# Patient Record
Sex: FEMALE | Race: WHITE | URBAN - METROPOLITAN AREA
[De-identification: names, ages, dates, MRNs, and addresses within clinical notes are randomized per-mention and may not be internally consistent; named-entity substitution may affect disease eponyms.]

---

## 2018-10-21 ENCOUNTER — EMERGENCY (EMERGENCY)
Facility: HOSPITAL | Age: 63
LOS: 1 days | End: 2018-10-21
Payer: COMMERCIAL

## 2018-10-21 PROCEDURE — 73562 X-RAY EXAM OF KNEE 3: CPT | Mod: 26,LT

## 2018-10-21 PROCEDURE — 73610 X-RAY EXAM OF ANKLE: CPT | Mod: 26,LT

## 2018-10-21 PROCEDURE — 73590 X-RAY EXAM OF LOWER LEG: CPT | Mod: 26,LT

## 2018-10-21 PROCEDURE — 99283 EMERGENCY DEPT VISIT LOW MDM: CPT

## 2022-09-23 ENCOUNTER — INPATIENT (INPATIENT)
Facility: HOSPITAL | Age: 67
LOS: 1 days | Discharge: HOME | End: 2022-09-25
Attending: INTERNAL MEDICINE | Admitting: INTERNAL MEDICINE
Payer: COMMERCIAL

## 2022-09-23 VITALS
SYSTOLIC BLOOD PRESSURE: 145 MMHG | RESPIRATION RATE: 18 BRPM | TEMPERATURE: 99 F | DIASTOLIC BLOOD PRESSURE: 85 MMHG | OXYGEN SATURATION: 98 % | HEART RATE: 65 BPM

## 2022-09-23 DIAGNOSIS — R09.81 NASAL CONGESTION: ICD-10-CM

## 2022-09-23 DIAGNOSIS — E87.1 HYPO-OSMOLALITY AND HYPONATREMIA: ICD-10-CM

## 2022-09-23 DIAGNOSIS — I10 ESSENTIAL (PRIMARY) HYPERTENSION: ICD-10-CM

## 2022-09-23 DIAGNOSIS — Z78.9 OTHER SPECIFIED HEALTH STATUS: Chronic | ICD-10-CM

## 2022-09-23 DIAGNOSIS — M85.80 OTHER SPECIFIED DISORDERS OF BONE DENSITY AND STRUCTURE, UNSPECIFIED SITE: ICD-10-CM

## 2022-09-23 DIAGNOSIS — E03.9 HYPOTHYROIDISM, UNSPECIFIED: ICD-10-CM

## 2022-09-23 DIAGNOSIS — U07.1 COVID-19: ICD-10-CM

## 2022-09-23 DIAGNOSIS — E78.5 HYPERLIPIDEMIA, UNSPECIFIED: ICD-10-CM

## 2022-09-23 DIAGNOSIS — Z29.9 ENCOUNTER FOR PROPHYLACTIC MEASURES, UNSPECIFIED: ICD-10-CM

## 2022-09-23 LAB
ALBUMIN SERPL ELPH-MCNC: 3.9 G/DL — SIGNIFICANT CHANGE UP (ref 3.3–5)
ALP SERPL-CCNC: 49 U/L — SIGNIFICANT CHANGE UP (ref 40–120)
ALT FLD-CCNC: 14 U/L — SIGNIFICANT CHANGE UP (ref 4–33)
ANION GAP SERPL CALC-SCNC: 10 MMOL/L — SIGNIFICANT CHANGE UP (ref 7–14)
ANION GAP SERPL CALC-SCNC: 9 MMOL/L — SIGNIFICANT CHANGE UP (ref 7–14)
AST SERPL-CCNC: 29 U/L — SIGNIFICANT CHANGE UP (ref 4–32)
BASOPHILS # BLD AUTO: 0.02 K/UL — SIGNIFICANT CHANGE UP (ref 0–0.2)
BASOPHILS NFR BLD AUTO: 0.5 % — SIGNIFICANT CHANGE UP (ref 0–2)
BILIRUB SERPL-MCNC: 0.6 MG/DL — SIGNIFICANT CHANGE UP (ref 0.2–1.2)
BUN SERPL-MCNC: 10 MG/DL — SIGNIFICANT CHANGE UP (ref 7–23)
BUN SERPL-MCNC: 9 MG/DL — SIGNIFICANT CHANGE UP (ref 7–23)
CALCIUM SERPL-MCNC: 7.9 MG/DL — LOW (ref 8.4–10.5)
CALCIUM SERPL-MCNC: 8.2 MG/DL — LOW (ref 8.4–10.5)
CHLORIDE SERPL-SCNC: 93 MMOL/L — LOW (ref 98–107)
CHLORIDE SERPL-SCNC: 94 MMOL/L — LOW (ref 98–107)
CO2 SERPL-SCNC: 19 MMOL/L — LOW (ref 22–31)
CO2 SERPL-SCNC: 22 MMOL/L — SIGNIFICANT CHANGE UP (ref 22–31)
CREAT SERPL-MCNC: 0.6 MG/DL — SIGNIFICANT CHANGE UP (ref 0.5–1.3)
CREAT SERPL-MCNC: 0.77 MG/DL — SIGNIFICANT CHANGE UP (ref 0.5–1.3)
EGFR: 85 ML/MIN/1.73M2 — SIGNIFICANT CHANGE UP
EGFR: 99 ML/MIN/1.73M2 — SIGNIFICANT CHANGE UP
EOSINOPHIL # BLD AUTO: 0.07 K/UL — SIGNIFICANT CHANGE UP (ref 0–0.5)
EOSINOPHIL NFR BLD AUTO: 1.7 % — SIGNIFICANT CHANGE UP (ref 0–6)
FLUAV AG NPH QL: SIGNIFICANT CHANGE UP
FLUBV AG NPH QL: SIGNIFICANT CHANGE UP
GLUCOSE SERPL-MCNC: 83 MG/DL — SIGNIFICANT CHANGE UP (ref 70–99)
GLUCOSE SERPL-MCNC: 93 MG/DL — SIGNIFICANT CHANGE UP (ref 70–99)
HCT VFR BLD CALC: 38.3 % — SIGNIFICANT CHANGE UP (ref 34.5–45)
HGB BLD-MCNC: 13.6 G/DL — SIGNIFICANT CHANGE UP (ref 11.5–15.5)
IANC: 1.04 K/UL — LOW (ref 1.8–7.4)
IMM GRANULOCYTES NFR BLD AUTO: 0.2 % — SIGNIFICANT CHANGE UP (ref 0–0.9)
LIDOCAIN IGE QN: 37 U/L — SIGNIFICANT CHANGE UP (ref 7–60)
LYMPHOCYTES # BLD AUTO: 2.58 K/UL — SIGNIFICANT CHANGE UP (ref 1–3.3)
LYMPHOCYTES # BLD AUTO: 63.5 % — HIGH (ref 13–44)
MAGNESIUM SERPL-MCNC: 1.7 MG/DL — SIGNIFICANT CHANGE UP (ref 1.6–2.6)
MCHC RBC-ENTMCNC: 31.6 PG — SIGNIFICANT CHANGE UP (ref 27–34)
MCHC RBC-ENTMCNC: 35.5 GM/DL — SIGNIFICANT CHANGE UP (ref 32–36)
MCV RBC AUTO: 89.1 FL — SIGNIFICANT CHANGE UP (ref 80–100)
MONOCYTES # BLD AUTO: 0.34 K/UL — SIGNIFICANT CHANGE UP (ref 0–0.9)
MONOCYTES NFR BLD AUTO: 8.4 % — SIGNIFICANT CHANGE UP (ref 2–14)
NEUTROPHILS # BLD AUTO: 1.04 K/UL — LOW (ref 1.8–7.4)
NEUTROPHILS NFR BLD AUTO: 25.7 % — LOW (ref 43–77)
NRBC # BLD: 0 /100 WBCS — SIGNIFICANT CHANGE UP (ref 0–0)
NRBC # FLD: 0 K/UL — SIGNIFICANT CHANGE UP (ref 0–0)
OSMOLALITY SERPL: 260 MOSM/KG — LOW (ref 275–295)
OSMOLALITY UR: 355 MOSM/KG — SIGNIFICANT CHANGE UP (ref 50–1200)
PHOSPHATE SERPL-MCNC: 2.2 MG/DL — LOW (ref 2.5–4.5)
PLATELET # BLD AUTO: 200 K/UL — SIGNIFICANT CHANGE UP (ref 150–400)
POTASSIUM SERPL-MCNC: 4 MMOL/L — SIGNIFICANT CHANGE UP (ref 3.5–5.3)
POTASSIUM SERPL-MCNC: 4.1 MMOL/L — SIGNIFICANT CHANGE UP (ref 3.5–5.3)
POTASSIUM SERPL-SCNC: 4 MMOL/L — SIGNIFICANT CHANGE UP (ref 3.5–5.3)
POTASSIUM SERPL-SCNC: 4.1 MMOL/L — SIGNIFICANT CHANGE UP (ref 3.5–5.3)
PROT SERPL-MCNC: 7.1 G/DL — SIGNIFICANT CHANGE UP (ref 6–8.3)
RBC # BLD: 4.3 M/UL — SIGNIFICANT CHANGE UP (ref 3.8–5.2)
RBC # FLD: 12.7 % — SIGNIFICANT CHANGE UP (ref 10.3–14.5)
RSV RNA NPH QL NAA+NON-PROBE: SIGNIFICANT CHANGE UP
SARS-COV-2 RNA SPEC QL NAA+PROBE: DETECTED
SODIUM SERPL-SCNC: 123 MMOL/L — LOW (ref 135–145)
SODIUM SERPL-SCNC: 124 MMOL/L — LOW (ref 135–145)
SODIUM UR-SCNC: 95 MMOL/L — SIGNIFICANT CHANGE UP
T4 FREE SERPL-MCNC: 0.8 NG/DL — LOW (ref 0.9–1.8)
TSH SERPL-MCNC: 0.67 UIU/ML — SIGNIFICANT CHANGE UP (ref 0.27–4.2)
WBC # BLD: 4.06 K/UL — SIGNIFICANT CHANGE UP (ref 3.8–10.5)
WBC # FLD AUTO: 4.06 K/UL — SIGNIFICANT CHANGE UP (ref 3.8–10.5)

## 2022-09-23 PROCEDURE — 99223 1ST HOSP IP/OBS HIGH 75: CPT

## 2022-09-23 PROCEDURE — 99284 EMERGENCY DEPT VISIT MOD MDM: CPT

## 2022-09-23 RX ORDER — LANOLIN ALCOHOL/MO/W.PET/CERES
3 CREAM (GRAM) TOPICAL AT BEDTIME
Refills: 0 | Status: DISCONTINUED | OUTPATIENT
Start: 2022-09-23 | End: 2022-09-25

## 2022-09-23 RX ORDER — ONDANSETRON 8 MG/1
4 TABLET, FILM COATED ORAL ONCE
Refills: 0 | Status: COMPLETED | OUTPATIENT
Start: 2022-09-23 | End: 2022-09-23

## 2022-09-23 RX ORDER — SODIUM CHLORIDE 9 MG/ML
1000 INJECTION INTRAMUSCULAR; INTRAVENOUS; SUBCUTANEOUS ONCE
Refills: 0 | Status: COMPLETED | OUTPATIENT
Start: 2022-09-23 | End: 2022-09-23

## 2022-09-23 RX ORDER — PANTOPRAZOLE SODIUM 20 MG/1
40 TABLET, DELAYED RELEASE ORAL
Refills: 0 | Status: DISCONTINUED | OUTPATIENT
Start: 2022-09-23 | End: 2022-09-25

## 2022-09-23 RX ORDER — CHOLECALCIFEROL (VITAMIN D3) 125 MCG
1000 CAPSULE ORAL DAILY
Refills: 0 | Status: DISCONTINUED | OUTPATIENT
Start: 2022-09-23 | End: 2022-09-25

## 2022-09-23 RX ORDER — ACETAMINOPHEN 500 MG
650 TABLET ORAL EVERY 6 HOURS
Refills: 0 | Status: DISCONTINUED | OUTPATIENT
Start: 2022-09-23 | End: 2022-09-25

## 2022-09-23 RX ORDER — ATORVASTATIN CALCIUM 80 MG/1
20 TABLET, FILM COATED ORAL AT BEDTIME
Refills: 0 | Status: DISCONTINUED | OUTPATIENT
Start: 2022-09-23 | End: 2022-09-25

## 2022-09-23 RX ORDER — ENOXAPARIN SODIUM 100 MG/ML
40 INJECTION SUBCUTANEOUS EVERY 24 HOURS
Refills: 0 | Status: DISCONTINUED | OUTPATIENT
Start: 2022-09-24 | End: 2022-09-25

## 2022-09-23 RX ORDER — LEVOTHYROXINE SODIUM 125 MCG
50 TABLET ORAL DAILY
Refills: 0 | Status: DISCONTINUED | OUTPATIENT
Start: 2022-09-23 | End: 2022-09-25

## 2022-09-23 RX ADMIN — Medication 650 MILLIGRAM(S): at 23:15

## 2022-09-23 RX ADMIN — SODIUM CHLORIDE 1000 MILLILITER(S): 9 INJECTION INTRAMUSCULAR; INTRAVENOUS; SUBCUTANEOUS at 12:03

## 2022-09-23 RX ADMIN — ONDANSETRON 4 MILLIGRAM(S): 8 TABLET, FILM COATED ORAL at 12:02

## 2022-09-23 RX ADMIN — ONDANSETRON 4 MILLIGRAM(S): 8 TABLET, FILM COATED ORAL at 23:08

## 2022-09-23 RX ADMIN — Medication 650 MILLIGRAM(S): at 22:30

## 2022-09-23 RX ADMIN — ATORVASTATIN CALCIUM 20 MILLIGRAM(S): 80 TABLET, FILM COATED ORAL at 22:30

## 2022-09-23 NOTE — H&P ADULT - NSHPLABSRESULTS_GEN_ALL_CORE
13.6   4.06  )-----------( 200      ( 23 Sep 2022 12:09 )             38.3     09-23    123<L>  |  94<L>  |  9   ----------------------------<  93  4.1   |  19<L>  |  0.60    Ca    7.9<L>      23 Sep 2022 14:55  Phos  2.2     09-23  Mg     1.70     09-23    TPro  7.1  /  Alb  3.9  /  TBili  0.6  /  DBili  x   /  AST  29  /  ALT  14  /  AlkPhos  49  09-23        LIVER FUNCTIONS - ( 23 Sep 2022 12:09 )  Alb: 3.9 g/dL / Pro: 7.1 g/dL / ALK PHOS: 49 U/L / ALT: 14 U/L / AST: 29 U/L / GGT: x               EKG:     RADIOLOGY STUDIES:

## 2022-09-23 NOTE — H&P ADULT - HISTORY OF PRESENT ILLNESS
Mrs. Galvan is a 65 yo woman with htn, hld, hypothyroid, osteopenia who presents after testing COVID positive with congestion and hyponatremia on labs. She had been feeling mild dizziness and fatigue which led her to take a COVID test on Monday 9/19. Since then she has been feeling face/head "pressure," chills, cough, and yesterday she felt nauseated and vomited white fluid. Home temperature measured up to 100F. She feels improved but presented because she thought her symptoms were persisting too long.     She has tried tylenol which helped pain a little and went to urgent care yesterday where she was given albuterol inhaler, has used twice but hard to notice a difference. She has been eating less than normal; last night she had soup and gatorade. She denies night sweats/weight loss, CP, SOB, abd pain, dysuria, urinary frequency, diarrhea/constipation.    ED course: VSA, afebrile. Na 123, serum Osm 260. Given 1L NS.  Mrs. Galvan is a 67 yo woman with htn, hld, hypothyroid, osteopenia who presents after testing COVID positive with congestion and hyponatremia on labs. She had been feeling mild dizziness and fatigue which led her to take a COVID test on Monday 9/19. Since then she has been feeling face/head "pressure," chills, cough, and yesterday she felt nauseated and vomited white fluid. Home temperature measured up to 100F. She feels improved but presented because she thought her symptoms were persisting too long.     She has tried tylenol which helped pain a little and went to urgent care yesterday where she was given albuterol inhaler, has used twice but hard to notice a difference. She has been eating less than normal; last night she had soup and gatorade. She denies night sweats/weight loss, CP, SOB, abd pain, dysuria, urinary frequency, diarrhea/constipation.    ED course: VSA, afebrile. Na 123, serum Osm 260. Given 1L NS.

## 2022-09-23 NOTE — ED ADULT NURSE REASSESSMENT NOTE - NS ED NURSE REASSESS COMMENT FT1
Pt resting family at bedside. tolerated small amount of dinner. Pt with no nausea or vomiting . Pt denies sob at this time . Pt with no fever. awaiting  floor bed.

## 2022-09-23 NOTE — H&P ADULT - NSICDXPASTMEDICALHX_GEN_ALL_CORE_FT
PAST MEDICAL HISTORY:  Hiatal hernia     HLD (hyperlipidemia)     HTN (hypertension)     Hypothyroidism     Osteopenia

## 2022-09-23 NOTE — ED PROVIDER NOTE - OBJECTIVE STATEMENT
66y F with hx of HTN, hypothyroidism, HLD, recently tested positive for COVID on Monday presenting for nasal congestion, sinus congestion, and cough x 5 days. Went to urgent care yesterday and was prescribed Albuterol and a cough suppressant, with no relief. Additionally states that she has had nausea and decreased appetite, with spitting up food whenever she tries to eat.

## 2022-09-23 NOTE — ED PROVIDER NOTE - PROGRESS NOTE DETAILS
Elmer: hyponatremic; given IVF, ate small amounts of food; repeat sodium 123, sent urine NA, osmolality; will be admitted

## 2022-09-23 NOTE — H&P ADULT - PROBLEM SELECTOR PLAN 2
Likely due to COVID infection. Improving  - pain management with tylenol Likely due to COVID infection. Improving  - pain management with tylenol prn  - no remdesivir or steroids for now

## 2022-09-23 NOTE — ED ADULT TRIAGE NOTE - CHIEF COMPLAINT QUOTE
Pt AOX4 c/o headache and sinus ache; pt had ++ Covid Test 5 days ago; was seen by her MD and given albuterol, denies SOB or difficulty breathing, just has a lot of congestion and sinus pressure  Hx of HTN and hypothyroid

## 2022-09-23 NOTE — ED ADULT NURSE NOTE - CCCP TRG CHIEF CMPLNT
[facility]  Progress Note     Lacie Holden Patient Status:  Inpatient    1976 MRN [id number]   [facility] 3SW-A Attending Danielle Yi MD   [facility] Day # 1 PCP Armand Garcia DO     Chief Complaint: Medical management    S: dexamethasone Sodium Phosphate  4 mg Intravenous BID   • buPROPion HCl ER (XL)  450 mg Oral Daily   • escitalopram  30 mg Oral Daily   • Dicyclomine HCl  20 mg Oral Nightly   • Pantoprazole Sodium  40 mg Oral QAM AC   • Pravastatin Sodium  10 mg Oral Night headache/sinus congestion/pain

## 2022-09-23 NOTE — H&P ADULT - PROBLEM SELECTOR PLAN 1
serum lox=652, urine Na=95, urine vhc=744  - hypotonic hyponatremia  - SIADH could be from COVID infection or underlying undiagnosed malignancy. Nonsmoker, no B sxs and normal colonoscopy 2 yrs ago make malignancy less likely  - pt prescribed HCTZ but not currently taking   - clinically euvolemic, no hx of HF, but dec effective arterial circulation can be cause  - normal saline  - monitor BMP serum uzo=005, urine Na=95, urine jig=444  - hypotonic hyponatremia  - SIADH could be from COVID infection or underlying undiagnosed malignancy. Nonsmoker, no B sxs and normal colonoscopy 2 yrs ago make malignancy less likely  - pt prescribed HCTZ but not currently taking   - clinically euvolemic, no hx of HF, but dec effective arterial circulation can be cause  - normal saline  - monitor BMP serum egz=889, urine Na=95, urine wiu=726  - hypotonic hyponatremia  - SIADH could be from COVID infection or underlying undiagnosed malignancy. Nonsmoker, no B sxs and normal colonoscopy 2 yrs ago make malignancy less likely  - pt prescribed HCTZ but not currently taking   - clinically euvolemic, no hx of HF, but dec effective arterial circulation can be cause  - normal saline  - monitor BMP serum cdr=310, urine Na=95, urine rgg=557  - hypotonic hyponatremia  - SIADH could be from COVID infection or underlying undiagnosed malignancy. Nonsmoker, no B sxs and normal colonoscopy 2 yrs ago make malignancy less likely  - pt prescribed HCTZ but not currently taking   - clinically euvolemic, no hx of HF, but dec effective arterial circulation can be cause  - fluid restrict overnight (1L)   - normal saline  - monitor BMP   - if Na still low in am, recheck urine studies serum gek=218, urine Na=95, urine mqm=287  - hypotonic hyponatremia  - SIADH could be from COVID infection or underlying undiagnosed malignancy. Nonsmoker, no B sxs and normal colonoscopy 2 yrs ago make malignancy less likely  - pt prescribed HCTZ but not currently taking   - clinically euvolemic, no hx of HF, but dec effective arterial circulation can be cause  - fluid restrict overnight (1L)   - normal saline  - monitor BMP   - if Na still low in am, recheck urine studies serum twb=837, urine Na=95, urine eut=152  - hypotonic hyponatremia  - SIADH could be from COVID infection or underlying undiagnosed malignancy. Nonsmoker, no B sxs and normal colonoscopy 2 yrs ago make malignancy less likely  - pt prescribed HCTZ but not currently taking   - clinically euvolemic, no hx of HF, but dec effective arterial circulation can be cause  - fluid restrict overnight (1L)   - normal saline  - monitor BMP   - if Na still low in am, recheck urine studies

## 2022-09-23 NOTE — ED PROVIDER NOTE - CONSTITUTIONAL, MLM
normal... Well appearing, awake, alert, oriented to person, place, time/situation and in no apparent distress. SpO2 is 99% on room air.

## 2022-09-23 NOTE — ED ADULT NURSE NOTE - OBJECTIVE STATEMENT
pt awake and alert c congestion and decreased appetite with nausea, medicated for her symptoms fluids given. Pt with no vomiting.

## 2022-09-23 NOTE — H&P ADULT - NSHPPHYSICALEXAM_GEN_ALL_CORE
VITALS:   T(C): 37.2 (09-23-22 @ 16:44), Max: 37.2 (09-23-22 @ 16:44)  HR: 68 (09-23-22 @ 16:44) (65 - 68)  BP: 132/74 (09-23-22 @ 16:44) (132/74 - 145/85)  RR: 18 (09-23-22 @ 16:44) (18 - 18)  SpO2: 100% (09-23-22 @ 16:44) (98% - 100%)    GENERAL: NAD, lying in bed comfortably  HEAD:  Atraumatic, normocephalic  EYES: EOMI, PERRLA, conjunctiva and sclera clear  ENT: Moist mucous membranes  NECK: Supple, no JVD  HEART: Regular rate and rhythm, no murmurs, rubs, or gallops  LUNGS: Unlabored respirations.  Clear to auscultation bilaterally, no crackles, wheezing, or rhonchi  ABDOMEN: Soft, nontender, nondistended, +BS  EXTREMITIES: 2+ peripheral pulses bilaterally. No clubbing, cyanosis, or edema  NERVOUS SYSTEM:  A&Ox3, no focal deficits   SKIN: No rashes or lesions

## 2022-09-23 NOTE — H&P ADULT - PROBLEM SELECTOR PLAN 4
- c/w alendronate 70mg weekly  - c/w vitamin D3 1000u qd - c/w alendronate 70mg weekly  - c/w vitamin D3 1000u qd  - asymptomatic hypocalcemia, no ecg changes  - monitor ca in morning

## 2022-09-23 NOTE — H&P ADULT - ATTENDING COMMENTS
Pt is a 65 yo Australian speaking F with PMH HTN, HLD, osteopenia, and hypothyroidism p/w HA, sinus pain and congestion, decreased appetite, and nausea in setting of known outpt COVID+ 9/20. Found to have lymphocytosis, hyponatremia, and hypocalcemia. Evaluated bedside in no acute distress, comfortable on exam. Says her biggest complaint is congestion/sinus pain and poor appetite; generally feels fatigued. Hemodynamically stable and exam benign; patient on RA without respiratory distress.     A/P:  #COVID-19   - COVID+ 9/20 with persistent HA, sinus pain/congestion, decreased appetite/PO intake, and nausea; evaluated at urgent care and given albuterol, benzonatate, and paxlovid without improvement (unclear if pt took paxlovid)  - hemodynamically stable without hypoxia, RA O2sat 98%  - check CRP, ferritin, d-dimer  - does not currently meet criteria for remdesivir/decadron, will provide supportive care for now (only mild risk factor is age >65)  - tylenol PRN  - consider decongestant if continues to c/o congestion     #Hyponatremia  - on arrival, Na 124 with repeat after 1L ; serum osm 260, urine osm 355, urine sodium 95  - euvolemic on exam  - likely hypotonic hyponatremia -- etiology could be thyroid dysfunction (check TSH/T4), SIADH (2/2 COVID)  - med rec also with HCTZ, however pt denies taking recently -- will continue to hold during admission  - most likely SIADH - will trial 1L fluid restriction and monitor am Na    #Hypocalcemia  - Ca 8.2 on arrival, known hx osteopenia on alendronate and vit D  - trend in AM  - currently asymptomatic and without EKG changes -- consider Ca glu supplementation PRN     #Lymphocytosis  - on arrival, WBC 4 with 63% lymphocytes  - unknown baseline / history  - continue to trend  - consider peripheral smear in AM  - consider heme consult AM    Rest as outlined above. Pt is a 67 yo Swazi speaking F with PMH HTN, HLD, osteopenia, and hypothyroidism p/w HA, sinus pain and congestion, decreased appetite, and nausea in setting of known outpt COVID+ 9/20. Found to have lymphocytosis, hyponatremia, and hypocalcemia. Evaluated bedside in no acute distress, comfortable on exam. Says her biggest complaint is congestion/sinus pain and poor appetite; generally feels fatigued. Hemodynamically stable and exam benign; patient on RA without respiratory distress.     A/P:  #COVID-19   - COVID+ 9/20 with persistent HA, sinus pain/congestion, decreased appetite/PO intake, and nausea; evaluated at urgent care and given albuterol, benzonatate, and paxlovid without improvement (unclear if pt took paxlovid)  - hemodynamically stable without hypoxia, RA O2sat 98%  - check CRP, ferritin, d-dimer  - does not currently meet criteria for remdesivir/decadron, will provide supportive care for now (only mild risk factor is age >65)  - tylenol PRN  - consider decongestant if continues to c/o congestion     #Hyponatremia  - on arrival, Na 124 with repeat after 1L ; serum osm 260, urine osm 355, urine sodium 95  - euvolemic on exam  - likely hypotonic hyponatremia -- etiology could be thyroid dysfunction (check TSH/T4), SIADH (2/2 COVID)  - med rec also with HCTZ, however pt denies taking recently -- will continue to hold during admission  - most likely SIADH - will trial 1L fluid restriction and monitor am Na    #Hypocalcemia  - Ca 8.2 on arrival, known hx osteopenia on alendronate and vit D  - trend in AM  - currently asymptomatic and without EKG changes -- consider Ca glu supplementation PRN     #Lymphocytosis  - on arrival, WBC 4 with 63% lymphocytes  - unknown baseline / history  - continue to trend  - consider peripheral smear in AM  - consider heme consult AM    Rest as outlined above. Pt is a 65 yo Citizen of [place] speaking F with PMH HTN, HLD, osteopenia, and hypothyroidism p/w HA, sinus pain and congestion, decreased appetite, and nausea in setting of known outpt COVID+ 9/20. Found to have lymphocytosis, hyponatremia, and hypocalcemia. Evaluated bedside in no acute distress, comfortable on exam. Says her biggest complaint is congestion/sinus pain and poor appetite; generally feels fatigued. Hemodynamically stable and exam benign; patient on RA without respiratory distress.     A/P:  #COVID-19   - COVID+ 9/20 with persistent HA, sinus pain/congestion, decreased appetite/PO intake, and nausea; evaluated at urgent care and given albuterol, benzonatate, and paxlovid without improvement (unclear if pt took paxlovid)  - hemodynamically stable without hypoxia, RA O2sat 98%  - check CRP, ferritin, d-dimer  - does not currently meet criteria for remdesivir/decadron, will provide supportive care for now (only mild risk factor is age >65)  - tylenol PRN  - consider decongestant if continues to c/o congestion     #Hyponatremia  - on arrival, Na 124 with repeat after 1L ; serum osm 260, urine osm 355, urine sodium 95  - euvolemic on exam  - likely hypotonic hyponatremia -- etiology could be thyroid dysfunction (check TSH/T4), SIADH (2/2 COVID)  - med rec also with HCTZ, however pt denies taking recently -- will continue to hold during admission  - most likely SIADH - will trial 1L fluid restriction and monitor am Na    #Hypocalcemia  - Ca 8.2 on arrival, known hx osteopenia on alendronate and vit D  - trend in AM  - currently asymptomatic and without EKG changes -- consider Ca glu supplementation PRN     #Lymphocytosis  - on arrival, WBC 4 with 63% lymphocytes  - unknown baseline / history  - continue to trend  - consider peripheral smear in AM  - consider heme consult AM    Rest as outlined above.

## 2022-09-23 NOTE — H&P ADULT - NSHPREVIEWOFSYSTEMS_GEN_ALL_CORE
REVIEW OF SYSTEMS:  CONSTITUTIONAL: +chills, fatigue, improved now   EYES/ENT: No visual changes;  No vertigo or throat pain   NECK: No pain or stiffness  RESPIRATORY: No cough, wheezing, hemoptysis; No shortness of breath  CARDIOVASCULAR: No chest pain or palpitations  GASTROINTESTINAL: No abdominal or epigastric pain. No nausea, vomiting, or hematemesis; No diarrhea or constipation. No melena or hematochezia.  GENITOURINARY: No dysuria, frequency or hematuria  NEUROLOGICAL: No numbness or weakness  SKIN: No itching, rashes REVIEW OF SYSTEMS:  CONSTITUTIONAL: +chills, fatigue, improved now   EYES/ENT: No visual changes;  No vertigo or throat pain   NECK: No pain or stiffness  RESPIRATORY: +cough, no wheezing, hemoptysis; No shortness of breath  CARDIOVASCULAR: No chest pain or palpitations  GASTROINTESTINAL: No abdominal or epigastric pain. +1 episode white vomitus, no hematemesis; No diarrhea or constipation. No melena or hematochezia.  GENITOURINARY: No dysuria, frequency or hematuria  NEUROLOGICAL: +frontal headache, No numbness or weakness  SKIN: No itching, rashes

## 2022-09-23 NOTE — H&P ADULT - ASSESSMENT
Mrs. Galvan is a 67 yo woman with htn, hld, hypothyroid, osteopenia who presents after testing COVID positive with congestion and hyponatremia. Her congestion, headache, cough likely from COVID infection while hyponatremia with urine Na=95 can be due to hypothryoidism or SIADH, with third spacing of fluids, mineralocorticoid deficiency and loss d/t vomiting less likely.  Mrs. Galvan is a 65 yo woman with htn, hld, hypothyroid, osteopenia who presents after testing COVID positive with congestion and hyponatremia. Her congestion, headache, cough likely from COVID infection while hyponatremia with urine Na=95 can be due to hypothryoidism or SIADH, with third spacing of fluids, mineralocorticoid deficiency and loss d/t vomiting less likely.

## 2022-09-23 NOTE — ED PROVIDER NOTE - CLINICAL SUMMARY MEDICAL DECISION MAKING FREE TEXT BOX
66y F with hx of HTN, hypothyroidism, HLD, recently tested positive for COVID on Monday presenting for nasal congestion, sinus congestion, and cough x 5 days. Likely sequelae from COVID. Given elderly female with vomiting, will check labs. Will give Zofran and fluids and reassess. Likely URI sx from COVID.

## 2022-09-24 LAB
ALBUMIN SERPL ELPH-MCNC: 3.8 G/DL — SIGNIFICANT CHANGE UP (ref 3.3–5)
ALP SERPL-CCNC: 45 U/L — SIGNIFICANT CHANGE UP (ref 40–120)
ALT FLD-CCNC: 18 U/L — SIGNIFICANT CHANGE UP (ref 4–33)
ANION GAP SERPL CALC-SCNC: 11 MMOL/L — SIGNIFICANT CHANGE UP (ref 7–14)
ANION GAP SERPL CALC-SCNC: 12 MMOL/L — SIGNIFICANT CHANGE UP (ref 7–14)
AST SERPL-CCNC: 28 U/L — SIGNIFICANT CHANGE UP (ref 4–32)
BASOPHILS # BLD AUTO: 0 K/UL — SIGNIFICANT CHANGE UP (ref 0–0.2)
BASOPHILS NFR BLD AUTO: 0 % — SIGNIFICANT CHANGE UP (ref 0–2)
BILIRUB SERPL-MCNC: 0.6 MG/DL — SIGNIFICANT CHANGE UP (ref 0.2–1.2)
BUN SERPL-MCNC: 6 MG/DL — LOW (ref 7–23)
BUN SERPL-MCNC: 8 MG/DL — SIGNIFICANT CHANGE UP (ref 7–23)
CALCIUM SERPL-MCNC: 8.2 MG/DL — LOW (ref 8.4–10.5)
CALCIUM SERPL-MCNC: 8.3 MG/DL — LOW (ref 8.4–10.5)
CHLORIDE SERPL-SCNC: 96 MMOL/L — LOW (ref 98–107)
CHLORIDE SERPL-SCNC: 98 MMOL/L — SIGNIFICANT CHANGE UP (ref 98–107)
CO2 SERPL-SCNC: 19 MMOL/L — LOW (ref 22–31)
CO2 SERPL-SCNC: 20 MMOL/L — LOW (ref 22–31)
CREAT SERPL-MCNC: 0.72 MG/DL — SIGNIFICANT CHANGE UP (ref 0.5–1.3)
CREAT SERPL-MCNC: 0.74 MG/DL — SIGNIFICANT CHANGE UP (ref 0.5–1.3)
CRP SERPL-MCNC: 10.8 MG/L — HIGH
D DIMER BLD IA.RAPID-MCNC: 185 NG/ML DDU — SIGNIFICANT CHANGE UP
EGFR: 89 ML/MIN/1.73M2 — SIGNIFICANT CHANGE UP
EGFR: 92 ML/MIN/1.73M2 — SIGNIFICANT CHANGE UP
EOSINOPHIL # BLD AUTO: 0 K/UL — SIGNIFICANT CHANGE UP (ref 0–0.5)
EOSINOPHIL NFR BLD AUTO: 0 % — SIGNIFICANT CHANGE UP (ref 0–6)
FERRITIN SERPL-MCNC: 224 NG/ML — HIGH (ref 15–150)
GLUCOSE SERPL-MCNC: 105 MG/DL — HIGH (ref 70–99)
GLUCOSE SERPL-MCNC: 78 MG/DL — SIGNIFICANT CHANGE UP (ref 70–99)
HCT VFR BLD CALC: 36.5 % — SIGNIFICANT CHANGE UP (ref 34.5–45)
HGB BLD-MCNC: 13.2 G/DL — SIGNIFICANT CHANGE UP (ref 11.5–15.5)
IANC: 0.89 K/UL — LOW (ref 1.8–7.4)
LYMPHOCYTES # BLD AUTO: 1.9 K/UL — SIGNIFICANT CHANGE UP (ref 1–3.3)
LYMPHOCYTES # BLD AUTO: 43 % — SIGNIFICANT CHANGE UP (ref 13–44)
MAGNESIUM SERPL-MCNC: 1.7 MG/DL — SIGNIFICANT CHANGE UP (ref 1.6–2.6)
MAGNESIUM SERPL-MCNC: 1.9 MG/DL — SIGNIFICANT CHANGE UP (ref 1.6–2.6)
MANUAL SMEAR VERIFICATION: SIGNIFICANT CHANGE UP
MCHC RBC-ENTMCNC: 31.7 PG — SIGNIFICANT CHANGE UP (ref 27–34)
MCHC RBC-ENTMCNC: 36.2 GM/DL — HIGH (ref 32–36)
MCV RBC AUTO: 87.5 FL — SIGNIFICANT CHANGE UP (ref 80–100)
MONOCYTES # BLD AUTO: 0.31 K/UL — SIGNIFICANT CHANGE UP (ref 0–0.9)
MONOCYTES NFR BLD AUTO: 7 % — SIGNIFICANT CHANGE UP (ref 2–14)
NEUTROPHILS # BLD AUTO: 1.86 K/UL — SIGNIFICANT CHANGE UP (ref 1.8–7.4)
NEUTROPHILS NFR BLD AUTO: 42.1 % — LOW (ref 43–77)
PHOSPHATE SERPL-MCNC: 2 MG/DL — LOW (ref 2.5–4.5)
PHOSPHATE SERPL-MCNC: 3.2 MG/DL — SIGNIFICANT CHANGE UP (ref 2.5–4.5)
PLAT MORPH BLD: NORMAL — SIGNIFICANT CHANGE UP
PLATELET # BLD AUTO: 211 K/UL — SIGNIFICANT CHANGE UP (ref 150–400)
PLATELET COUNT - ESTIMATE: NORMAL — SIGNIFICANT CHANGE UP
POTASSIUM SERPL-MCNC: 3.7 MMOL/L — SIGNIFICANT CHANGE UP (ref 3.5–5.3)
POTASSIUM SERPL-MCNC: 3.9 MMOL/L — SIGNIFICANT CHANGE UP (ref 3.5–5.3)
POTASSIUM SERPL-SCNC: 3.7 MMOL/L — SIGNIFICANT CHANGE UP (ref 3.5–5.3)
POTASSIUM SERPL-SCNC: 3.9 MMOL/L — SIGNIFICANT CHANGE UP (ref 3.5–5.3)
PROT SERPL-MCNC: 6.7 G/DL — SIGNIFICANT CHANGE UP (ref 6–8.3)
RBC # BLD: 4.17 M/UL — SIGNIFICANT CHANGE UP (ref 3.8–5.2)
RBC # FLD: 12.8 % — SIGNIFICANT CHANGE UP (ref 10.3–14.5)
RBC BLD AUTO: NORMAL — SIGNIFICANT CHANGE UP
SMUDGE CELLS # BLD: PRESENT — SIGNIFICANT CHANGE UP
SODIUM SERPL-SCNC: 127 MMOL/L — LOW (ref 135–145)
SODIUM SERPL-SCNC: 129 MMOL/L — LOW (ref 135–145)
VARIANT LYMPHS # BLD: 7.9 % — HIGH (ref 0–6)
WBC # BLD: 4.41 K/UL — SIGNIFICANT CHANGE UP (ref 3.8–10.5)
WBC # FLD AUTO: 4.41 K/UL — SIGNIFICANT CHANGE UP (ref 3.8–10.5)

## 2022-09-24 PROCEDURE — 99233 SBSQ HOSP IP/OBS HIGH 50: CPT

## 2022-09-24 RX ORDER — ONDANSETRON 8 MG/1
4 TABLET, FILM COATED ORAL EVERY 6 HOURS
Refills: 0 | Status: DISCONTINUED | OUTPATIENT
Start: 2022-09-24 | End: 2022-09-25

## 2022-09-24 RX ORDER — FLUTICASONE PROPIONATE 50 MCG
1 SPRAY, SUSPENSION NASAL
Refills: 0 | Status: DISCONTINUED | OUTPATIENT
Start: 2022-09-24 | End: 2022-09-25

## 2022-09-24 RX ORDER — POTASSIUM PHOSPHATE, MONOBASIC POTASSIUM PHOSPHATE, DIBASIC 236; 224 MG/ML; MG/ML
30 INJECTION, SOLUTION INTRAVENOUS ONCE
Refills: 0 | Status: COMPLETED | OUTPATIENT
Start: 2022-09-24 | End: 2022-09-24

## 2022-09-24 RX ORDER — ONDANSETRON 8 MG/1
4 TABLET, FILM COATED ORAL ONCE
Refills: 0 | Status: COMPLETED | OUTPATIENT
Start: 2022-09-24 | End: 2022-09-24

## 2022-09-24 RX ADMIN — ENOXAPARIN SODIUM 40 MILLIGRAM(S): 100 INJECTION SUBCUTANEOUS at 05:21

## 2022-09-24 RX ADMIN — PANTOPRAZOLE SODIUM 40 MILLIGRAM(S): 20 TABLET, DELAYED RELEASE ORAL at 05:22

## 2022-09-24 RX ADMIN — Medication 1000 UNIT(S): at 12:52

## 2022-09-24 RX ADMIN — Medication 1 SPRAY(S): at 17:42

## 2022-09-24 RX ADMIN — Medication 650 MILLIGRAM(S): at 12:57

## 2022-09-24 RX ADMIN — ONDANSETRON 4 MILLIGRAM(S): 8 TABLET, FILM COATED ORAL at 10:03

## 2022-09-24 RX ADMIN — ATORVASTATIN CALCIUM 20 MILLIGRAM(S): 80 TABLET, FILM COATED ORAL at 21:31

## 2022-09-24 RX ADMIN — Medication 50 MICROGRAM(S): at 05:23

## 2022-09-24 RX ADMIN — POTASSIUM PHOSPHATE, MONOBASIC POTASSIUM PHOSPHATE, DIBASIC 83.33 MILLIMOLE(S): 236; 224 INJECTION, SOLUTION INTRAVENOUS at 12:57

## 2022-09-24 RX ADMIN — Medication 650 MILLIGRAM(S): at 14:09

## 2022-09-24 NOTE — PROVIDER CONTACT NOTE (OTHER) - BACKGROUND
65 y/o female admitted for hyponatremia and congestion, PMHx HTN, HLD, osteoarthritis 67 y/o female admitted for hyponatremia and congestion, PMHx HTN, HLD, osteoarthritis

## 2022-09-24 NOTE — PROGRESS NOTE ADULT - PROBLEM SELECTOR PLAN 2
Likely due to COVID infection. Improving  - pain management with tylenol prn  - no remdesivir or steroids for now Likely due to COVID infection. Improving  - pain management with tylenol prn  - no remdesivir or steroids

## 2022-09-24 NOTE — PROGRESS NOTE ADULT - PROBLEM SELECTOR PLAN 4
- c/w alendronate 70mg weekly  - c/w vitamin D3 1000u qd  - asymptomatic hypocalcemia, no ecg changes  - monitor ca in morning - c/w alendronate 70mg weekly - take at home  - c/w vitamin D3 1000u qd  - asymptomatic hypocalcemia, no ecg changes  - monitor ca   - phos repletion could help ca

## 2022-09-24 NOTE — PATIENT PROFILE ADULT - BILL PAYMENT
Spoke with pt via phone states, she took OTC Laxative for constipation and had a bowel movement shortly afterward. Encouraged pt to contact our office with any other questions/conerns.pt voiced an understanding   no

## 2022-09-24 NOTE — PROGRESS NOTE ADULT - ATTENDING COMMENTS
Pt is a 67 yo Haitian speaking F with PMH HTN, HLD, osteopenia, and hypothyroidism p/w HA, sinus pain and congestion, decreased appetite, and nausea in setting of known outpt COVID+ 9/20. Found to have lymphocytosis, hyponatremia, and hypocalcemia. Evaluated bedside in no acute distress, comfortable on exam. Says her biggest complaint is congestion/sinus pain and poor appetite; generally feels fatigued. Hemodynamically stable and exam benign; patient on RA without respiratory distress.     A/P:  #COVID-19   - COVID+ 9/20 with persistent HA, sinus pain/congestion, decreased appetite/PO intake, and nausea; evaluated at urgent care and given albuterol, benzonatate, and paxlovid without improvement (pt denies taking paxlovid)  - hemodynamically stable without hypoxia, RA O2sat 98%  - does not currently meet criteria for remdesivir/decadron, will provide supportive care for now (only mild risk factor is age >65)  - feeling better today  - tylenol PRN  - flonase PRN congestion    #Hyponatremia  - on arrival, Na 124 with repeat after 1L ; serum osm 260, urine osm 355, urine sodium 95  - euvolemic on exam  - likely hypotonic hyponatremia -- etiology could be thyroid dysfunction (check TSH/T4), SIADH (2/2 COVID)  - med rec also with HCTZ, however pt denies taking recently -- will continue to hold during admission  - most likely SIADH - improving with 1L fluid restriction    #Hypocalcemia  - Ca 8.2 on arrival, known hx osteopenia on alendronate and vit D  - continue to trend  - currently asymptomatic and without EKG changes -- consider Ca glu supplementation PRN     Dispo: pending PT eval. Rest as outlined above.    Discussed with pt, , and daughter bedside today. Pt is a 67 yo Ivorian speaking F with PMH HTN, HLD, osteopenia, and hypothyroidism p/w HA, sinus pain and congestion, decreased appetite, and nausea in setting of known outpt COVID+ 9/20. Found to have lymphocytosis, hyponatremia, and hypocalcemia. Evaluated bedside in no acute distress, comfortable on exam. Says her biggest complaint is congestion/sinus pain and poor appetite; generally feels fatigued. Hemodynamically stable and exam benign; patient on RA without respiratory distress.     A/P:  #COVID-19   - COVID+ 9/20 with persistent HA, sinus pain/congestion, decreased appetite/PO intake, and nausea; evaluated at urgent care and given albuterol, benzonatate, and paxlovid without improvement (pt denies taking paxlovid)  - hemodynamically stable without hypoxia, RA O2sat 98%  - does not currently meet criteria for remdesivir/decadron, will provide supportive care for now (only mild risk factor is age >65)  - feeling better today  - tylenol PRN  - flonase PRN congestion    #Hyponatremia  - on arrival, Na 124 with repeat after 1L ; serum osm 260, urine osm 355, urine sodium 95  - euvolemic on exam  - likely hypotonic hyponatremia -- etiology could be thyroid dysfunction (check TSH/T4), SIADH (2/2 COVID)  - med rec also with HCTZ, however pt denies taking recently -- will continue to hold during admission  - most likely SIADH - improving with 1L fluid restriction    #Hypocalcemia  - Ca 8.2 on arrival, known hx osteopenia on alendronate and vit D  - continue to trend  - currently asymptomatic and without EKG changes -- consider Ca glu supplementation PRN     Dispo: pending PT eval. Rest as outlined above.    Discussed with pt, , and daughter bedside today. Pt is a 65 yo Ugandan speaking F with PMH HTN, HLD, osteopenia, and hypothyroidism p/w HA, sinus pain and congestion, decreased appetite, and nausea in setting of known outpt COVID+ 9/20. Found to have lymphocytosis, hyponatremia, and hypocalcemia. Evaluated bedside in no acute distress, comfortable on exam. Says her biggest complaint is congestion/sinus pain and poor appetite; generally feels fatigued. Hemodynamically stable and exam benign; patient on RA without respiratory distress.     A/P:  #COVID-19   - COVID+ 9/20 with persistent HA, sinus pain/congestion, decreased appetite/PO intake, and nausea; evaluated at urgent care and given albuterol, benzonatate, and paxlovid without improvement (pt denies taking paxlovid)  - hemodynamically stable without hypoxia, RA O2sat 98%  - does not currently meet criteria for remdesivir/decadron, will provide supportive care for now (only mild risk factor is age >65)  - feeling better today  - tylenol PRN  - flonase PRN congestion    #Hyponatremia  - on arrival, Na 124 with repeat after 1L ; serum osm 260, urine osm 355, urine sodium 95  - euvolemic on exam  - likely hypotonic hyponatremia -- etiology could be thyroid dysfunction (check TSH/T4), SIADH (2/2 COVID)  - med rec also with HCTZ, however pt denies taking recently -- will continue to hold during admission  - most likely SIADH - improving with 1L fluid restriction    #Hypocalcemia  - Ca 8.2 on arrival, known hx osteopenia on alendronate and vit D  - continue to trend  - currently asymptomatic and without EKG changes -- consider Ca glu supplementation PRN     Dispo: pending PT eval. Rest as outlined above.    Discussed with pt, , and daughter bedside today.

## 2022-09-24 NOTE — DISCHARGE NOTE PROVIDER - NSDCMRMEDTOKEN_GEN_ALL_CORE_FT
alendronate 70 mg oral tablet: 1 tab(s) orally once a week  atorvastatin 20 mg oral tablet: 1 tab(s) orally once a day  levothyroxine 50 mcg (0.05 mg) oral tablet: 1 tab(s) orally once a day  lisinopril 10 mg oral tablet: 1 tab(s) orally once a day  metoprolol succinate 25 mg oral tablet, extended release: 1 tab(s) orally once a day  omeprazole 40 mg oral delayed release capsule: 1 cap(s) orally once a day  Vitamin D3 25 mcg (1000 intl units) oral tablet: 1 tab(s) orally once a day   alendronate 70 mg oral tablet: 1 tab(s) orally once a week  atorvastatin 20 mg oral tablet: 1 tab(s) orally once a day  bisacodyl 10 mg rectal suppository: 1 suppository(ies) rectal once a day, As needed, Constipation  fluticasone 50 mcg/inh nasal spray: 1 spray(s) nasal 2 times a day  levothyroxine 50 mcg (0.05 mg) oral tablet: 1 tab(s) orally once a day  lisinopril 10 mg oral tablet: 1 tab(s) orally once a day  metoprolol succinate 25 mg oral tablet, extended release: 1 tab(s) orally once a day  omeprazole 40 mg oral delayed release capsule: 1 cap(s) orally once a day  ondansetron 4 mg oral tablet: 1 tab(s) orally every 8 hours  polyethylene glycol 3350 oral powder for reconstitution: 17 gram(s) orally 2 times a day  Vitamin D3 25 mcg (1000 intl units) oral tablet: 1 tab(s) orally once a day

## 2022-09-24 NOTE — PATIENT PROFILE ADULT - FALL HARM RISK - HARM RISK INTERVENTIONS
Communicate Risk of Fall with Harm to all staff/Reinforce activity limits and safety measures with patient and family/Tailored Fall Risk Interventions/Visual Cue: Yellow wristband and red socks/Bed in lowest position, wheels locked, appropriate side rails in place/Call bell, personal items and telephone in reach/Instruct patient to call for assistance before getting out of bed or chair/Non-slip footwear when patient is out of bed/[place] to call system/Physically safe environment - no spills, clutter or unnecessary equipment/Purposeful Proactive Rounding/Room/bathroom lighting operational, light cord in reach Communicate Risk of Fall with Harm to all staff/Reinforce activity limits and safety measures with patient and family/Tailored Fall Risk Interventions/Visual Cue: Yellow wristband and red socks/Bed in lowest position, wheels locked, appropriate side rails in place/Call bell, personal items and telephone in reach/Instruct patient to call for assistance before getting out of bed or chair/Non-slip footwear when patient is out of bed/[place] to call system/Physically safe environment - no spills, clutter or unnecessary equipment/Purposeful Proactive Rounding/Room/bathroom lighting operational, light cord in reach Communicate Risk of Fall with Harm to all staff/Reinforce activity limits and safety measures with patient and family/Tailored Fall Risk Interventions/Visual Cue: Yellow wristband and red socks/Bed in lowest position, wheels locked, appropriate side rails in place/Call bell, personal items and telephone in reach/Instruct patient to call for assistance before getting out of bed or chair/Non-slip footwear when patient is out of bed/[place] to call system/Physically safe environment - no spills, clutter or unnecessary equipment/Purposeful Proactive Rounding/Room/bathroom lighting operational, light cord in reach

## 2022-09-24 NOTE — DISCHARGE NOTE PROVIDER - PROVIDER TOKENS
PROVIDER:[TOKEN:[[id number]],FOLLOWUP:[1 week]] PROVIDER:[TOKEN:[[id number]],FOLLOWUP:[1 week]] PROVIDER:[TOKEN:[[id number]],FOLLOWUP:[1 week]]

## 2022-09-24 NOTE — PROGRESS NOTE ADULT - PROBLEM SELECTOR PLAN 5
Apparently stable however undertreatment can cause hypoNa  - check TSH, T4 Apparently stable however undertreatment can cause hypoNa  - TSH WNL, T4 0.8

## 2022-09-24 NOTE — DISCHARGE NOTE PROVIDER - HOSPITAL COURSE
Mrs. Galvan is a 67 yo woman with htn, hld, hypothyroid, osteopenia who presents after testing COVID positive with congestion and hyponatremia on labs. She had been feeling mild dizziness and fatigue which led her to take a COVID test on Monday 9/19. Since then she has been feeling face/head "pressure," chills, cough, and yesterday she felt nauseated and vomited white fluid. Home temperature measured up to 100F. She feels improved but presented because she thought her symptoms were persisting too long.     She has tried tylenol which helped pain a little and went to urgent care where she was given albuterol inhaler, has used twice but hard to notice a difference. She has been eating less than normal; last night she had soup and gatorade. She denies night sweats/weight loss, CP, SOB, abd pain, dysuria, urinary frequency, diarrhea/constipation.    Pt was found to be hyponatremic to 123, likely SIADH. With fluid restriction, Na resolved. She has minimal respiratory symptoms, no fever, and was treated with supportive care and management for nausea and headache.    She is now stable, normonatremic, and medically optimized for discharge. Mrs. Galvan is a 67 yo woman with htn, hld, hypothyroid, osteopenia who presents after testing COVID positive with congestion and hyponatremia on labs. She had been feeling mild dizziness and fatigue which led her to take a COVID test on Monday 9/19. Since then she has been feeling face/head "pressure," chills, cough, and yesterday she felt nauseated and vomited white fluid. Home temperature measured up to 100F. She feels improved but presented because she thought her symptoms were persisting too long.     She has tried tylenol which helped pain a little and went to urgent care where she was given albuterol inhaler, has used twice but hard to notice a difference. She has been eating less than normal; last night she had soup and gatorade. She denies night sweats/weight loss, CP, SOB, abd pain, dysuria, urinary frequency, diarrhea/constipation.    Pt was found to be hyponatremic to 123, likely SIADH. With fluid restriction, Na resolved. She has minimal respiratory symptoms, no fever, and was treated with supportive care and management for nausea and headache.    She is now stable with improving sodium, and medically optimized for discharge. Mrs. Galvan is a 65 yo woman with htn, hld, hypothyroid, osteopenia who presents after testing COVID positive with congestion and hyponatremia on labs. She had been feeling mild dizziness and fatigue which led her to take a COVID test on Monday 9/19. Since then she has been feeling face/head "pressure," chills, cough, and yesterday she felt nauseated and vomited white fluid. Home temperature measured up to 100F. She feels improved but presented because she thought her symptoms were persisting too long.     She has tried tylenol which helped pain a little and went to urgent care where she was given albuterol inhaler, has used twice but hard to notice a difference. She has been eating less than normal; last night she had soup and gatorade. She denies night sweats/weight loss, CP, SOB, abd pain, dysuria, urinary frequency, diarrhea/constipation.    Pt was found to be hyponatremic to 123, likely SIADH. With fluid restriction, Na resolved. She has minimal respiratory symptoms, no fever, and was treated with supportive care and management for nausea and headache.    She is now stable with improving sodium, and medically optimized for discharge. Mrs. aGlvan is a 65 yo woman with htn, hld, hypothyroid, osteopenia who presents after testing COVID positive with congestion and hyponatremia on labs. She had been feeling mild dizziness and fatigue which led her to take a COVID test on Monday 9/19. Since then she has been feeling face/head "pressure," chills, cough, and yesterday she felt nauseated and vomited white fluid. Home temperature measured up to 100F. She feels improved but presented because she thought her symptoms were persisting too long.     She has tried tylenol which helped pain a little and went to urgent care where she was given albuterol inhaler, has used twice but hard to notice a difference. She has been eating less than normal; last night she had soup and gatorade. She denies night sweats/weight loss, CP, SOB, abd pain, dysuria, urinary frequency, diarrhea/constipation.    Pt was found to be hyponatremic to 123, likely SIADH. With fluid restriction, Na resolved. She has minimal respiratory symptoms, no fever, and was treated with supportive care and management for nausea and headache.    She is now stable with improving sodium, and medically optimized for discharge.

## 2022-09-24 NOTE — DISCHARGE NOTE PROVIDER - ATTENDING DISCHARGE PHYSICAL EXAMINATION:
VITAL SIGNS:  T(C): 36.8 (09-25-22 @ 05:49), Max: 36.9 (09-24-22 @ 21:35)  T(F): 98.2 (09-25-22 @ 05:49), Max: 98.4 (09-24-22 @ 21:35)  HR: 80 (09-25-22 @ 05:49) (75 - 80)  BP: 125/81 (09-25-22 @ 05:49) (119/81 - 125/81)  BP(mean): --  RR: 17 (09-25-22 @ 05:49) (17 - 17)  SpO2: 99% (09-25-22 @ 05:49) (98% - 99%)  Wt(kg): --    PHYSICAL EXAM:  Constitutional: WDWN resting comfortably in bed; NAD  Head: NC/AT  Eyes: PERRL, EOMI, anicteric sclera  ENT: no nasal discharge; MMM  Neck: supple; no JVD  Respiratory: CTA B/L; no W/R/R; on RA without respiratory distress  Cardiac: +S1/S2; RRR; no M/R/G  Gastrointestinal: soft, NT/ND; no rebound or guarding; +BSx4  Extremities: WWP, no clubbing or cyanosis; no peripheral edema  Musculoskeletal: NROM x4; no joint swelling, tenderness or erythema  Vascular: 2+ radial, DP/PT pulses B/L  Dermatologic: skin warm, dry and intact; no rashes, wounds, or scars  Neurologic: AAOx3; CNII-XII grossly intact; no focal deficits  Psychiatric: affect and characteristics of appearance, verbalizations, behaviors are appropriate

## 2022-09-24 NOTE — PROGRESS NOTE ADULT - PROBLEM SELECTOR PLAN 1
serum nna=809, urine Na=95, urine lkr=685  - hypotonic hyponatremia  - SIADH could be from COVID infection or underlying undiagnosed malignancy. Nonsmoker, no B sxs and normal colonoscopy 2 yrs ago make malignancy less likely  - pt prescribed HCTZ but not currently taking   - clinically euvolemic, no hx of HF, but dec effective arterial circulation can be cause  - fluid restrict overnight (1L)   - normal saline  - monitor BMP   - if Na still low in am, recheck urine studies serum ypw=077, urine Na=95, urine pmw=696  - hypotonic hyponatremia  - SIADH could be from COVID infection or underlying undiagnosed malignancy. Nonsmoker, no B sxs and normal colonoscopy 2 yrs ago make malignancy less likely  - pt prescribed HCTZ but not currently taking   - clinically euvolemic, no hx of HF, but dec effective arterial circulation can be cause  - fluid restrict overnight (1L)   - normal saline  - monitor BMP   - if Na still low in am, recheck urine studies serum cqr=833, urine Na=95, urine yrr=825  - hypotonic hyponatremia  - SIADH could be from COVID infection or underlying undiagnosed malignancy. Nonsmoker, no B sxs and normal colonoscopy 2 yrs ago make malignancy less likely  - pt prescribed HCTZ but not currently taking   - clinically euvolemic, no hx of HF, but dec effective arterial circulation can be cause  - fluid restrict overnight (1L)   - normal saline  - monitor BMP   - if Na still low in am, recheck urine studies serum ufg=925, urine Na=95, urine bdr=954  improving  - hypotonic hyponatremia  - SIADH could be from COVID infection or underlying undiagnosed malignancy. Nonsmoker, no B sxs and normal colonoscopy 2 yrs ago make malignancy less likely  - pt prescribed HCTZ but not currently taking   - clinically euvolemic, no hx of HF, but dec effective arterial circulation can be cause  - fluid restrict (1L)   - continue to monitor BMP, can dc when Na 130s serum tyh=779, urine Na=95, urine vnd=944  improving  - hypotonic hyponatremia  - SIADH could be from COVID infection or underlying undiagnosed malignancy. Nonsmoker, no B sxs and normal colonoscopy 2 yrs ago make malignancy less likely  - pt prescribed HCTZ but not currently taking   - clinically euvolemic, no hx of HF, but dec effective arterial circulation can be cause  - fluid restrict (1L)   - continue to monitor BMP, can dc when Na 130s serum nds=762, urine Na=95, urine fww=045  improving  - hypotonic hyponatremia  - SIADH could be from COVID infection or underlying undiagnosed malignancy. Nonsmoker, no B sxs and normal colonoscopy 2 yrs ago make malignancy less likely  - pt prescribed HCTZ but not currently taking   - clinically euvolemic, no hx of HF, but dec effective arterial circulation can be cause  - fluid restrict (1L)   - continue to monitor BMP, can dc when Na 130s

## 2022-09-24 NOTE — DISCHARGE NOTE PROVIDER - CARE PROVIDER_API CALL
[facility]  Internal Medicine  [address], [address]  [place] [address]  Phone: [phone]  Fax: [phone]  Follow Up Time: 1 week   [facility]  Internal Medicine  [address], [address]  [place] [address]  Phone: [phone]  Fax: [phone]  Follow Up Time: 1 week   [facility]  Internal Medicine  [address], [address]  [place] [address]  Phone: [phone]  Fax: [phone]  Follow Up Time: 1 week

## 2022-09-24 NOTE — DISCHARGE NOTE PROVIDER - NSDCCPCAREPLAN_GEN_ALL_CORE_FT
PRINCIPAL DISCHARGE DIAGNOSIS  Diagnosis: Hyponatremia  Assessment and Plan of Treatment: You were diagnosed with hyponatremia (low sodium in your blood). This was likely due to your COVID infection. With limiting the amount of fluid you took in, the sodium yesy back to normal. It is important to eat a healthy and well balanced diet to maintain all important nutrients and electrolytes. Please follow up with your primary care doctor within 1 week.      SECONDARY DISCHARGE DIAGNOSES  Diagnosis: 2019 novel coronavirus disease (COVID-19)  Assessment and Plan of Treatment: You had COVID-19 while in the hospital. There were no concerning signs such as fever or breathing issue. This infection will clear on its own. Manage your headaches and congestion with rest and over the counter medications.     PRINCIPAL DISCHARGE DIAGNOSIS  Diagnosis: Hyponatremia  Assessment and Plan of Treatment: You were diagnosed with hyponatremia (low sodium in your blood). This was likely due to your COVID infection. With limiting the amount of fluid you took in, the sodium improved. Please limit your fluid intake to 1L per day. It is important to eat a healthy and well balanced diet to maintain all important nutrients and electrolytes. Please follow up with your primary care doctor within 1 week.      SECONDARY DISCHARGE DIAGNOSES  Diagnosis: 2019 novel coronavirus disease (COVID-19)  Assessment and Plan of Treatment: You had COVID-19 while in the hospital. There were no concerning signs such as fever or breathing issue. This infection will clear on its own. Manage your headaches and congestion with rest and over the counter medications.

## 2022-09-24 NOTE — PROGRESS NOTE ADULT - ASSESSMENT
Mrs. Galvan is a 65 yo woman with htn, hld, hypothyroid, osteopenia who presents after testing COVID positive with congestion and hyponatremia. Her congestion, headache, cough likely from COVID infection while hyponatremia with urine Na=95 can be due to hypothryoidism or SIADH, with third spacing of fluids, mineralocorticoid deficiency and loss d/t vomiting less likely.  Mrs. Galvan is a 67 yo woman with htn, hld, hypothyroid, osteopenia who presents after testing COVID positive with congestion and hyponatremia. Her congestion, headache, cough likely from COVID infection while hyponatremia with urine Na=95 can be due to hypothryoidism or SIADH, with third spacing of fluids, mineralocorticoid deficiency and loss d/t vomiting less likely.

## 2022-09-24 NOTE — PROGRESS NOTE ADULT - SUBJECTIVE AND OBJECTIVE BOX
Internal Medicine Progress Note    Patient is a 66y old  Female who presents with a chief complaint of Congestion, hyponatremia (23 Sep 2022 17:46)    OVERNIGHT EVENTS/SUBJECTIVE:    OBJECTIVE:  Vital Signs Last 24 Hrs  T(C): 36.8 (24 Sep 2022 05:27), Max: 37.2 (23 Sep 2022 16:44)  T(F): 98.2 (24 Sep 2022 05:27), Max: 99 (23 Sep 2022 16:44)  HR: 79 (24 Sep 2022 05:27) (65 - 80)  BP: 115/77 (24 Sep 2022 05:27) (115/77 - 145/85)  BP(mean): --  RR: 17 (24 Sep 2022 05:27) (17 - 18)  SpO2: 99% (24 Sep 2022 05:27) (97% - 100%)    Parameters below as of 24 Sep 2022 05:27  Patient On (Oxygen Delivery Method): room air      I&O's Detail    Daily Height in cm: 157.48 (23 Sep 2022 21:46)    Daily   Physical Exam:  General: NAD, resting comfortably in bed  Neuro: A&Ox4, 5/5 strength in all ext  HEENT: NC/AT, EOMI, normal hearing, oral mucosa moist, no oral lesions noted, no pharyngeal erythema, uvula midline  Neck: supple, thyroid not enlarged, no LAD  Resp: Breathing comfortably on RA, LCTA b/l  CV: Normal sinus rhythm, S1 and S2, no r/m/g  Abd: soft, non-distended, non-tender. No HSM.  Ext: ROMIx4, no edema, +2 pulses bilaterally  Skin: Warm and dry, no rashes or discolorations  Psych: Appropriate affect    Medications:  MEDICATIONS  (STANDING):  atorvastatin 20 milliGRAM(s) Oral at bedtime  cholecalciferol 1000 Unit(s) Oral daily  enoxaparin Injectable 40 milliGRAM(s) SubCutaneous every 24 hours  levothyroxine 50 MICROGram(s) Oral daily  pantoprazole    Tablet 40 milliGRAM(s) Oral before breakfast    MEDICATIONS  (PRN):  acetaminophen     Tablet .. 650 milliGRAM(s) Oral every 6 hours PRN Temp greater or equal to 38C (100.4F), Mild Pain (1 - 3)  melatonin 3 milliGRAM(s) Oral at bedtime PRN Insomnia      Labs:                        13.6   4.06  )-----------( 200      ( 23 Sep 2022 12:09 )             38.3     09-23    123<L>  |  94<L>  |  9   ----------------------------<  93  4.1   |  19<L>  |  0.60    Ca    7.9<L>      23 Sep 2022 14:55  Phos  2.2     09-23  Mg     1.70     09-23    TPro  7.1  /  Alb  3.9  /  TBili  0.6  /  DBili  x   /  AST  29  /  ALT  14  /  AlkPhos  49  09-23            Radiology: Internal Medicine Progress Note    Patient is a 66y old  Female who presents with a chief complaint of Congestion, hyponatremia (23 Sep 2022 17:46)    OVERNIGHT EVENTS/SUBJECTIVE: No overnight events. Pt reports occasional feeling of nausea but has not vomited. Her head congestion feels the same as yesterday. Denies fever, cp, sob, palps, v/d, urinary sxs. She is eating and drinking normally.     Vital Signs Last 24 Hrs  T(C): 36.8 (24 Sep 2022 05:27), Max: 37.2 (23 Sep 2022 16:44)  T(F): 98.2 (24 Sep 2022 05:27), Max: 99 (23 Sep 2022 16:44)  HR: 79 (24 Sep 2022 05:27) (65 - 80)  BP: 115/77 (24 Sep 2022 05:27) (115/77 - 145/85)  BP(mean): --  RR: 17 (24 Sep 2022 05:27) (17 - 18)  SpO2: 99% (24 Sep 2022 05:27) (97% - 100%)    Parameters below as of 24 Sep 2022 05:27  Patient On (Oxygen Delivery Method): room air      I&O's Detail    Daily Height in cm: 157.48 (23 Sep 2022 21:46)    Daily   Physical Exam:  General: NAD, resting comfortably in bed  Neuro: A&Ox4, no gross deficits   HEENC/AT, EOMI, normal hearing, oral mucosa moist, no oral lesions noted, no pharyngeal erythema  Resp: Breathing comfortably on RA, LCTA b/l  CV: Normal sinus rhythm, S1 and S2, no r/m/g  Abd: soft, non-distended, non-tender  Ext: no edema, +2 pulses bilaterally  Skin: Warm and dry, no rashes or discolorations  Psych: Appropriate affect    Medications:  MEDICATIONS  (STANDING):  atorvastatin 20 milliGRAM(s) Oral at bedtime  cholecalciferol 1000 Unit(s) Oral daily  enoxaparin Injectable 40 milliGRAM(s) SubCutaneous every 24 hours  levothyroxine 50 MICROGram(s) Oral daily  pantoprazole    Tablet 40 milliGRAM(s) Oral before breakfast    MEDICATIONS  (PRN):  acetaminophen     Tablet .. 650 milliGRAM(s) Oral every 6 hours PRN Temp greater or equal to 38C (100.4F), Mild Pain (1 - 3)  melatonin 3 milliGRAM(s) Oral at bedtime PRN Insomnia      Labs:                        13.6   4.06  )-----------( 200      ( 23 Sep 2022 12:09 )             38.3     09-23    123<L>  |  94<L>  |  9   ----------------------------<  93  4.1   |  19<L>  |  0.60    Ca    7.9<L>      23 Sep 2022 14:55  Phos  2.2     09-23  Mg     1.70     09-23    TPro  7.1  /  Alb  3.9  /  TBili  0.6  /  DBili  x   /  AST  29  /  ALT  14  /  AlkPhos  49  09-23            Radiology:

## 2022-09-25 VITALS
DIASTOLIC BLOOD PRESSURE: 79 MMHG | HEART RATE: 78 BPM | SYSTOLIC BLOOD PRESSURE: 120 MMHG | RESPIRATION RATE: 17 BRPM | OXYGEN SATURATION: 98 % | TEMPERATURE: 99 F

## 2022-09-25 LAB
ALBUMIN SERPL ELPH-MCNC: 3.6 G/DL — SIGNIFICANT CHANGE UP (ref 3.3–5)
ALP SERPL-CCNC: 45 U/L — SIGNIFICANT CHANGE UP (ref 40–120)
ALT FLD-CCNC: 25 U/L — SIGNIFICANT CHANGE UP (ref 4–33)
ANION GAP SERPL CALC-SCNC: 9 MMOL/L — SIGNIFICANT CHANGE UP (ref 7–14)
AST SERPL-CCNC: 38 U/L — HIGH (ref 4–32)
BILIRUB SERPL-MCNC: 0.6 MG/DL — SIGNIFICANT CHANGE UP (ref 0.2–1.2)
BUN SERPL-MCNC: 6 MG/DL — LOW (ref 7–23)
CALCIUM SERPL-MCNC: 8.3 MG/DL — LOW (ref 8.4–10.5)
CHLORIDE SERPL-SCNC: 99 MMOL/L — SIGNIFICANT CHANGE UP (ref 98–107)
CO2 SERPL-SCNC: 20 MMOL/L — LOW (ref 22–31)
CREAT SERPL-MCNC: 0.73 MG/DL — SIGNIFICANT CHANGE UP (ref 0.5–1.3)
EGFR: 91 ML/MIN/1.73M2 — SIGNIFICANT CHANGE UP
GLUCOSE SERPL-MCNC: 89 MG/DL — SIGNIFICANT CHANGE UP (ref 70–99)
HCT VFR BLD CALC: 35.4 % — SIGNIFICANT CHANGE UP (ref 34.5–45)
HCV AB S/CO SERPL IA: 0.17 S/CO — SIGNIFICANT CHANGE UP (ref 0–0.99)
HCV AB SERPL-IMP: SIGNIFICANT CHANGE UP
HGB BLD-MCNC: 12.3 G/DL — SIGNIFICANT CHANGE UP (ref 11.5–15.5)
MAGNESIUM SERPL-MCNC: 1.8 MG/DL — SIGNIFICANT CHANGE UP (ref 1.6–2.6)
MCHC RBC-ENTMCNC: 31 PG — SIGNIFICANT CHANGE UP (ref 27–34)
MCHC RBC-ENTMCNC: 34.7 GM/DL — SIGNIFICANT CHANGE UP (ref 32–36)
MCV RBC AUTO: 89.2 FL — SIGNIFICANT CHANGE UP (ref 80–100)
NRBC # BLD: 0 /100 WBCS — SIGNIFICANT CHANGE UP (ref 0–0)
NRBC # FLD: 0 K/UL — SIGNIFICANT CHANGE UP (ref 0–0)
PHOSPHATE SERPL-MCNC: 2.4 MG/DL — LOW (ref 2.5–4.5)
PLATELET # BLD AUTO: 219 K/UL — SIGNIFICANT CHANGE UP (ref 150–400)
POTASSIUM SERPL-MCNC: 3.8 MMOL/L — SIGNIFICANT CHANGE UP (ref 3.5–5.3)
POTASSIUM SERPL-SCNC: 3.8 MMOL/L — SIGNIFICANT CHANGE UP (ref 3.5–5.3)
PROT SERPL-MCNC: 6.7 G/DL — SIGNIFICANT CHANGE UP (ref 6–8.3)
RBC # BLD: 3.97 M/UL — SIGNIFICANT CHANGE UP (ref 3.8–5.2)
RBC # FLD: 12.8 % — SIGNIFICANT CHANGE UP (ref 10.3–14.5)
SODIUM SERPL-SCNC: 128 MMOL/L — LOW (ref 135–145)
WBC # BLD: 3.94 K/UL — SIGNIFICANT CHANGE UP (ref 3.8–10.5)
WBC # FLD AUTO: 3.94 K/UL — SIGNIFICANT CHANGE UP (ref 3.8–10.5)

## 2022-09-25 PROCEDURE — 99238 HOSP IP/OBS DSCHRG MGMT 30/<: CPT

## 2022-09-25 PROCEDURE — 99239 HOSP IP/OBS DSCHRG MGMT >30: CPT

## 2022-09-25 RX ORDER — FLUTICASONE PROPIONATE 50 MCG
1 SPRAY, SUSPENSION NASAL
Qty: 10 | Refills: 0
Start: 2022-09-25 | End: 2022-09-29

## 2022-09-25 RX ORDER — SODIUM,POTASSIUM PHOSPHATES 278-250MG
1 POWDER IN PACKET (EA) ORAL ONCE
Refills: 0 | Status: COMPLETED | OUTPATIENT
Start: 2022-09-25 | End: 2022-09-25

## 2022-09-25 RX ORDER — POLYETHYLENE GLYCOL 3350 17 G/17G
17 POWDER, FOR SOLUTION ORAL
Refills: 0 | Status: DISCONTINUED | OUTPATIENT
Start: 2022-09-25 | End: 2022-09-25

## 2022-09-25 RX ORDER — ONDANSETRON 8 MG/1
1 TABLET, FILM COATED ORAL
Qty: 9 | Refills: 0
Start: 2022-09-25 | End: 2022-09-27

## 2022-09-25 RX ORDER — POLYETHYLENE GLYCOL 3350 17 G/17G
17 POWDER, FOR SOLUTION ORAL
Qty: 170 | Refills: 0
Start: 2022-09-25 | End: 2022-09-29

## 2022-09-25 RX ADMIN — Medication 50 MICROGRAM(S): at 05:41

## 2022-09-25 RX ADMIN — Medication 1000 UNIT(S): at 11:48

## 2022-09-25 RX ADMIN — Medication 1 SPRAY(S): at 05:41

## 2022-09-25 RX ADMIN — Medication 650 MILLIGRAM(S): at 06:29

## 2022-09-25 RX ADMIN — PANTOPRAZOLE SODIUM 40 MILLIGRAM(S): 20 TABLET, DELAYED RELEASE ORAL at 05:41

## 2022-09-25 RX ADMIN — Medication 650 MILLIGRAM(S): at 05:45

## 2022-09-25 RX ADMIN — Medication 1 PACKET(S): at 11:46

## 2022-09-25 RX ADMIN — ENOXAPARIN SODIUM 40 MILLIGRAM(S): 100 INJECTION SUBCUTANEOUS at 05:40

## 2022-09-25 NOTE — PROGRESS NOTE ADULT - ATTENDING COMMENTS
Pt is a 67 yo Chilean speaking F with PMH HTN, HLD, osteopenia, and hypothyroidism p/w HA, sinus pain and congestion, decreased appetite, and nausea in setting of known outpt COVID+ 9/20. Found to have lymphocytosis, hyponatremia, and hypocalcemia, now improved. Evaluated bedside in no acute distress, comfortable on exam. Generally feels better and comfortable going home today. Hemodynamically stable and exam benign; patient on RA without respiratory distress.     A/P:  #COVID-19   - COVID+ 9/20 with persistent HA, sinus pain/congestion, decreased appetite/PO intake, and nausea; evaluated at urgent care and given albuterol, benzonatate, and paxlovid without improvement (pt denies taking paxlovid)  - hemodynamically stable without hypoxia, RA O2sat 98%  - does not currently meet criteria for remdesivir/decadron, will provide supportive care for now (only mild risk factor is age >65)  - feeling better today  - tylenol PRN  - flonase PRN congestion    #Hyponatremia  - on arrival, Na 124 with repeat after 1L ; serum osm 260, urine osm 355, urine sodium 95  - euvolemic on exam  - likely hypotonic hyponatremia -- etiology could be thyroid dysfunction (check TSH/T4), SIADH (2/2 COVID)  - med rec also with HCTZ, however pt denies taking recently -- will continue to hold during admission  - most likely SIADH - improving with 1L fluid restriction    #Hypocalcemia  - Ca 8.2 on arrival, known hx osteopenia on alendronate and vit D  - continue to trend  - currently asymptomatic and without EKG changes     Dispo: PT recs home no needs. Rest as outlined above.    Discussed with pt, , and daughter bedside today. Discharge home today. Pt is a 65 yo [place]uran speaking F with PMH HTN, HLD, osteopenia, and hypothyroidism p/w HA, sinus pain and congestion, decreased appetite, and nausea in setting of known outpt COVID+ 9/20. Found to have lymphocytosis, hyponatremia, and hypocalcemia, now improved. Evaluated bedside in no acute distress, comfortable on exam. Generally feels better and comfortable going home today. Hemodynamically stable and exam benign; patient on RA without respiratory distress.     A/P:  #COVID-19   - COVID+ 9/20 with persistent HA, sinus pain/congestion, decreased appetite/PO intake, and nausea; evaluated at urgent care and given albuterol, benzonatate, and paxlovid without improvement (pt denies taking paxlovid)  - hemodynamically stable without hypoxia, RA O2sat 98%  - does not currently meet criteria for remdesivir/decadron, will provide supportive care for now (only mild risk factor is age >65)  - feeling better today  - tylenol PRN  - flonase PRN congestion    #Hyponatremia  - on arrival, Na 124 with repeat after 1L ; serum osm 260, urine osm 355, urine sodium 95  - euvolemic on exam  - likely hypotonic hyponatremia -- etiology could be thyroid dysfunction (check TSH/T4), SIADH (2/2 COVID)  - med rec also with HCTZ, however pt denies taking recently -- will continue to hold during admission  - most likely SIADH - improving with 1L fluid restriction    #Hypocalcemia  - Ca 8.2 on arrival, known hx osteopenia on alendronate and vit D  - continue to trend  - currently asymptomatic and without EKG changes     Dispo: PT recs home no needs. Rest as outlined above.    Discussed with pt, , and daughter bedside today. Discharge home today. Pt is a 65 yo Moroccan speaking F with PMH HTN, HLD, osteopenia, and hypothyroidism p/w HA, sinus pain and congestion, decreased appetite, and nausea in setting of known outpt COVID+ 9/20. Found to have lymphocytosis, hyponatremia, and hypocalcemia, now improved. Evaluated bedside in no acute distress, comfortable on exam. Generally feels better and comfortable going home today. Hemodynamically stable and exam benign; patient on RA without respiratory distress.     A/P:  #COVID-19   - COVID+ 9/20 with persistent HA, sinus pain/congestion, decreased appetite/PO intake, and nausea; evaluated at urgent care and given albuterol, benzonatate, and paxlovid without improvement (pt denies taking paxlovid)  - hemodynamically stable without hypoxia, RA O2sat 98%  - does not currently meet criteria for remdesivir/decadron, will provide supportive care for now (only mild risk factor is age >65)  - feeling better today  - tylenol PRN  - flonase PRN congestion    #Hyponatremia  - on arrival, Na 124 with repeat after 1L ; serum osm 260, urine osm 355, urine sodium 95  - euvolemic on exam  - likely hypotonic hyponatremia -- etiology could be thyroid dysfunction (check TSH/T4), SIADH (2/2 COVID)  - med rec also with HCTZ, however pt denies taking recently -- will continue to hold during admission  - most likely SIADH - improving with 1L fluid restriction    #Hypocalcemia  - Ca 8.2 on arrival, known hx osteopenia on alendronate and vit D  - continue to trend  - currently asymptomatic and without EKG changes     Dispo: PT recs home no needs. Rest as outlined above.    Discussed with pt, , and daughter bedside today. Discharge home today.

## 2022-09-25 NOTE — PHYSICAL THERAPY INITIAL EVALUATION ADULT - PERTINENT HX OF CURRENT PROBLEM, REHAB EVAL
Pt is a 66 year old female presenting with Pt is a 66 year old female presenting with congestion and hyponatremia, likely SIADH.

## 2022-09-25 NOTE — PHYSICAL THERAPY INITIAL EVALUATION ADULT - GENERAL OBSERVATIONS, REHAB EVAL
Pt received standing in room, in NAD. Pt agreeable to participate in PT evaluation. Pt left seated on EOB, all needs met and RN aware.

## 2022-09-25 NOTE — PROGRESS NOTE ADULT - SUBJECTIVE AND OBJECTIVE BOX
**************************************  Billy Phillip, PGY-2  After 7PM, please contact night float at [phone] or [phone]  **************************************    INTERVAL HPI/OVERNIGHT EVENTS:  Patient was seen and examined at bedside. As per nurse and patient, no o/n events, patient resting comfortably. No complaints at this time.    VITAL SIGNS:  T(F): 98.2 (09-25-22 @ 05:49)  HR: 80 (09-25-22 @ 05:49)  BP: 125/81 (09-25-22 @ 05:49)  RR: 17 (09-25-22 @ 05:49)  SpO2: 99% (09-25-22 @ 05:49)  Wt(kg): --    PHYSICAL EXAM:    Physical Exam:  General: NAD, resting comfortably in bed  Neuro: A&Ox4, no gross deficits   HEENC/AT, EOMI, normal hearing, oral mucosa moist, no oral lesions noted, no pharyngeal erythema  Resp: Breathing comfortably on RA, LCTA b/l  CV: Normal sinus rhythm, S1 and S2, no r/m/g  Abd: soft, non-distended, non-tender  Ext: no edema, +2 pulses bilaterally  Skin: Warm and dry, no rashes or discolorations  Psych: Appropriate affect    MEDICATIONS  (STANDING):  atorvastatin 20 milliGRAM(s) Oral at bedtime  cholecalciferol 1000 Unit(s) Oral daily  enoxaparin Injectable 40 milliGRAM(s) SubCutaneous every 24 hours  fluticasone propionate 50 MICROgram(s)/spray Nasal Spray 1 Spray(s) Both Nostrils two times a day  levothyroxine 50 MICROGram(s) Oral daily  pantoprazole    Tablet 40 milliGRAM(s) Oral before breakfast    MEDICATIONS  (PRN):  acetaminophen     Tablet .. 650 milliGRAM(s) Oral every 6 hours PRN Temp greater or equal to 38C (100.4F), Mild Pain (1 - 3)  melatonin 3 milliGRAM(s) Oral at bedtime PRN Insomnia  ondansetron Injectable 4 milliGRAM(s) IV Push every 6 hours PRN Nausea and/or Vomiting      Allergies    No Known Allergies    Intolerances        LABS:                        12.3   3.94  )-----------( 219      ( 25 Sep 2022 06:55 )             35.4     09-25    128<L>  |  99  |  6<L>  ----------------------------<  89  3.8   |  20<L>  |  0.73    Ca    8.3<L>      25 Sep 2022 06:55  Phos  2.4     09-25  Mg     1.80     09-25    TPro  6.7  /  Alb  3.6  /  TBili  0.6  /  DBili  x   /  AST  38<H>  /  ALT  25  /  AlkPhos  45  09-25          RADIOLOGY & ADDITIONAL TESTS:  Reviewed **************************************  Billy Phillip, PGY-2  After 7PM, please contact night float at #[phone] or [phone]  **************************************    INTERVAL HPI/OVERNIGHT EVENTS:  Patient was seen and examined at bedside. As per nurse and patient, no o/n events, patient resting comfortably. No complaints at this time.    VITAL SIGNS:  T(F): 98.2 (09-25-22 @ 05:49)  HR: 80 (09-25-22 @ 05:49)  BP: 125/81 (09-25-22 @ 05:49)  RR: 17 (09-25-22 @ 05:49)  SpO2: 99% (09-25-22 @ 05:49)  Wt(kg): --    PHYSICAL EXAM:    Physical Exam:  General: NAD, resting comfortably in bed  Neuro: A&Ox4, no gross deficits   HEENC/AT, EOMI, normal hearing, oral mucosa moist, no oral lesions noted, no pharyngeal erythema  Resp: Breathing comfortably on RA, LCTA b/l  CV: Normal sinus rhythm, S1 and S2, no r/m/g  Abd: soft, non-distended, non-tender  Ext: no edema, +2 pulses bilaterally  Skin: Warm and dry, no rashes or discolorations  Psych: Appropriate affect    MEDICATIONS  (STANDING):  atorvastatin 20 milliGRAM(s) Oral at bedtime  cholecalciferol 1000 Unit(s) Oral daily  enoxaparin Injectable 40 milliGRAM(s) SubCutaneous every 24 hours  fluticasone propionate 50 MICROgram(s)/spray Nasal Spray 1 Spray(s) Both Nostrils two times a day  levothyroxine 50 MICROGram(s) Oral daily  pantoprazole    Tablet 40 milliGRAM(s) Oral before breakfast    MEDICATIONS  (PRN):  acetaminophen     Tablet .. 650 milliGRAM(s) Oral every 6 hours PRN Temp greater or equal to 38C (100.4F), Mild Pain (1 - 3)  melatonin 3 milliGRAM(s) Oral at bedtime PRN Insomnia  ondansetron Injectable 4 milliGRAM(s) IV Push every 6 hours PRN Nausea and/or Vomiting      Allergies    No Known Allergies    Intolerances        LABS:                        12.3   3.94  )-----------( 219      ( 25 Sep 2022 06:55 )             35.4     09-25    128<L>  |  99  |  6<L>  ----------------------------<  89  3.8   |  20<L>  |  0.73    Ca    8.3<L>      25 Sep 2022 06:55  Phos  2.4     09-25  Mg     1.80     09-25    TPro  6.7  /  Alb  3.6  /  TBili  0.6  /  DBili  x   /  AST  38<H>  /  ALT  25  /  AlkPhos  45  09-25          RADIOLOGY & ADDITIONAL TESTS:  Reviewed **************************************  Billy Phillip, PGY-2  After 7PM, please contact night float at #[phone] or [phone]  **************************************    INTERVAL HPI/OVERNIGHT EVENTS:  Patient was seen and examined at bedside. As per nurse and patient, no o/n events, patient resting comfortably. No complaints at this time.    VITAL SIGNS:  T(F): 98.2 (09-25-22 @ 05:49)  HR: 80 (09-25-22 @ 05:49)  BP: 125/81 (09-25-22 @ 05:49)  RR: 17 (09-25-22 @ 05:49)  SpO2: 99% (09-25-22 @ 05:49)  Wt(kg): --    PHYSICAL EXAM:    Physical Exam:  General: NAD, resting comfortably in bed  Neuro: A&Ox4, no gross deficits   HEENC/AT, EOMI, normal hearing, oral mucosa moist, no oral lesions noted, no pharyngeal erythema  Resp: Breathing comfortably on RA, LCTA b/l  CV: Normal sinus rhythm, S1 and S2, no r/m/g  Abd: soft, non-distended, non-tender  Ext: no edema, +2 pulses bilaterally  Skin: Warm and dry, no rashes or discolorations  Psych: Appropriate affect    MEDICATIONS  (STANDING):  atorvastatin 20 milliGRAM(s) Oral at bedtime  cholecalciferol 1000 Unit(s) Oral daily  enoxaparin Injectable 40 milliGRAM(s) SubCutaneous every 24 hours  fluticasone propionate 50 MICROgram(s)/spray Nasal Spray 1 Spray(s) Both Nostrils two times a day  levothyroxine 50 MICROGram(s) Oral daily  pantoprazole    Tablet 40 milliGRAM(s) Oral before breakfast    MEDICATIONS  (PRN):  acetaminophen     Tablet .. 650 milliGRAM(s) Oral every 6 hours PRN Temp greater or equal to 38C (100.4F), Mild Pain (1 - 3)  melatonin 3 milliGRAM(s) Oral at bedtime PRN Insomnia  ondansetron Injectable 4 milliGRAM(s) IV Push every 6 hours PRN Nausea and/or Vomiting      Allergies    No Known Allergies    Intolerances        LABS:                        12.3   3.94  )-----------( 219      ( 25 Sep 2022 06:55 )             35.4     09-25    128<L>  |  99  |  6<L>  ----------------------------<  89  3.8   |  20<L>  |  0.73    Ca    8.3<L>      25 Sep 2022 06:55  Phos  2.4     09-25  Mg     1.80     09-25    TPro  6.7  /  Alb  3.6  /  TBili  0.6  /  DBili  x   /  AST  38<H>  /  ALT  25  /  AlkPhos  45  09-25          RADIOLOGY & ADDITIONAL TESTS:  Reviewed

## 2022-09-25 NOTE — PROGRESS NOTE ADULT - PROBLEM SELECTOR PLAN 1
serum ynq=569, urine Na=95, urine jma=773  improving  - hypotonic hyponatremia  - SIADH could be from COVID infection or underlying undiagnosed malignancy. Nonsmoker, no B sxs and normal colonoscopy 2 yrs ago make malignancy less likely  - pt prescribed HCTZ but not currently taking   - clinically euvolemic, no hx of HF, but dec effective arterial circulation can be cause  - fluid restrict (1L)   - continue to monitor BMP, can dc when Na 130s serum vfj=814, urine Na=95, urine eqa=590  improving  - hypotonic hyponatremia  - SIADH could be from COVID infection or underlying undiagnosed malignancy. Nonsmoker, no B sxs and normal colonoscopy 2 yrs ago make malignancy less likely  - pt prescribed HCTZ but not currently taking   - clinically euvolemic, no hx of HF, but dec effective arterial circulation can be cause  - fluid restrict (1L)   - continue to monitor BMP, can dc when Na 130s serum ooj=479, urine Na=95, urine lzd=737  improving  - hypotonic hyponatremia  - SIADH could be from COVID infection or underlying undiagnosed malignancy. Nonsmoker, no B sxs and normal colonoscopy 2 yrs ago make malignancy less likely  - pt prescribed HCTZ but not currently taking   - clinically euvolemic, no hx of HF, but dec effective arterial circulation can be cause  - fluid restrict (1L)   - continue to monitor BMP, can dc when Na 130s

## 2022-09-25 NOTE — DISCHARGE NOTE NURSING/CASE MANAGEMENT/SOCIAL WORK - PATIENT PORTAL LINK FT
You can access the FollowMyHealth Patient Portal offered by [facility] by registering at the following website: [email or web address]. By joining [employer]’s FollowMyHealth portal, you will also be able to view your health information using other applications (apps) compatible with our system. You can access the FollowMyHealth Patient Portal offered by [facility] by registering at the following website: [email or web address]. By joining [employer]’s FollowMyHealth portal, you will also be able to view your health information using other applications (apps) compatible with our system. You can access the FollowMyHealth Patient Portal offered by [facility] by registering at the following website: [email or web address]. By joining [employer]’s FollowMyHealth portal, you will also be able to view your health information using other applications (apps) compatible with our system.

## 2022-09-25 NOTE — PROGRESS NOTE ADULT - PROBLEM SELECTOR PLAN 4
- c/w alendronate 70mg weekly - take at home  - c/w vitamin D3 1000u qd  - asymptomatic hypocalcemia, no ecg changes  - monitor ca   - phos repletion could help ca

## 2022-09-25 NOTE — PHYSICAL THERAPY INITIAL EVALUATION ADULT - PATIENT PROFILE REVIEW, REHAB EVAL
PT evaluate and treat orders received: Ambulate as tolerated. Consult with RN Bunny MORROW, pt may participate in PT evaluation./yes

## 2022-09-25 NOTE — PROGRESS NOTE ADULT - PROBLEM SELECTOR PLAN 2
Likely due to COVID infection. Improving  - pain management with tylenol prn  - no remdesivir or steroids

## 2022-09-25 NOTE — DISCHARGE NOTE NURSING/CASE MANAGEMENT/SOCIAL WORK - NSDCPEFALRISK_GEN_ALL_CORE
For information on Fall & Injury Prevention, visit: [email or web address] OR  [email or web address] OR  https://www.cdc.gov/steadi/patient.html For information on Fall & Injury Prevention, visit: [email or web address]-[email or web address]-mobility OR  [email or web address]-[email or web address]-injury OR  https://www.cdc.gov/steadi/patient.html For information on Fall & Injury Prevention, visit: [email or web address]/[email or web address]-[email or web address]-[email or web address]-mobility OR  [email or web address]://[email or web address]/news/fall-[email or web address]-tips-to-avoid-injury OR  https://www.cdc.gov/steadi/patient.html

## 2022-09-25 NOTE — PROGRESS NOTE ADULT - PROBLEM SELECTOR PLAN 3
Stable  - hold home lisinopril and metoprolol for now
Stable  - hold home lisinopril and metoprolol for now

## 2023-12-04 RX ORDER — CHOLECALCIFEROL (VITAMIN D3) 125 MCG
1 CAPSULE ORAL
Qty: 0 | Refills: 0 | DISCHARGE

## 2023-12-04 RX ORDER — LEVOTHYROXINE SODIUM 125 MCG
1 TABLET ORAL
Qty: 0 | Refills: 0 | DISCHARGE

## 2023-12-04 RX ORDER — ATORVASTATIN CALCIUM 80 MG/1
1 TABLET, FILM COATED ORAL
Qty: 0 | Refills: 0 | DISCHARGE

## 2023-12-04 RX ORDER — ALENDRONATE SODIUM 70 MG/1
1 TABLET ORAL
Qty: 0 | Refills: 0 | DISCHARGE

## 2023-12-04 RX ORDER — LISINOPRIL 2.5 MG/1
1 TABLET ORAL
Qty: 0 | Refills: 0 | DISCHARGE

## 2023-12-04 RX ORDER — OMEPRAZOLE 10 MG/1
1 CAPSULE, DELAYED RELEASE ORAL
Qty: 0 | Refills: 0 | DISCHARGE

## 2023-12-04 RX ORDER — METOPROLOL TARTRATE 50 MG
1 TABLET ORAL
Qty: 0 | Refills: 0 | DISCHARGE